# Patient Record
Sex: MALE | Race: WHITE | Employment: OTHER | URBAN - METROPOLITAN AREA
[De-identification: names, ages, dates, MRNs, and addresses within clinical notes are randomized per-mention and may not be internally consistent; named-entity substitution may affect disease eponyms.]

---

## 2022-12-07 ENCOUNTER — HOSPITAL ENCOUNTER (EMERGENCY)
Age: 77
Discharge: HOME | End: 2022-12-07
Attending: PHYSICIAN ASSISTANT
Payer: MEDICARE

## 2022-12-07 ENCOUNTER — APPOINTMENT (OUTPATIENT)
Dept: ULTRASOUND IMAGING | Age: 77
End: 2022-12-07
Attending: EMERGENCY MEDICINE
Payer: MEDICARE

## 2022-12-07 VITALS
RESPIRATION RATE: 18 BRPM | DIASTOLIC BLOOD PRESSURE: 95 MMHG | SYSTOLIC BLOOD PRESSURE: 145 MMHG | HEIGHT: 66 IN | OXYGEN SATURATION: 99 % | HEART RATE: 69 BPM | WEIGHT: 160 LBS | TEMPERATURE: 98.2 F | BODY MASS INDEX: 25.71 KG/M2

## 2022-12-07 DIAGNOSIS — N49.2 SCROTAL WALL ABSCESS: Primary | ICD-10-CM

## 2022-12-07 LAB
APPEARANCE UR: CLEAR
BILIRUB UR QL: NEGATIVE
COLOR UR: YELLOW
GLUCOSE UR STRIP.AUTO-MCNC: NEGATIVE MG/DL
HGB UR QL STRIP: NEGATIVE
KETONES UR QL STRIP.AUTO: NEGATIVE MG/DL
LEUKOCYTE ESTERASE UR QL STRIP.AUTO: NEGATIVE
NITRITE UR QL STRIP.AUTO: NEGATIVE
PH UR STRIP: 6 [PH] (ref 5–8)
PROT UR STRIP-MCNC: 30 MG/DL
RBC #/AREA URNS HPF: NORMAL /HPF (ref 0–5)
SP GR UR REFRACTOMETRY: 1.02 (ref 1–1.03)
UROBILINOGEN UR QL STRIP.AUTO: 1 EU/DL (ref 0.2–1)

## 2022-12-07 PROCEDURE — 81001 URINALYSIS AUTO W/SCOPE: CPT

## 2022-12-07 PROCEDURE — 74011000250 HC RX REV CODE- 250: Performed by: EMERGENCY MEDICINE

## 2022-12-07 PROCEDURE — 75810000289 HC I&D ABSCESS SIMP/COMP/MULT

## 2022-12-07 PROCEDURE — 99284 EMERGENCY DEPT VISIT MOD MDM: CPT

## 2022-12-07 PROCEDURE — 76870 US EXAM SCROTUM: CPT

## 2022-12-07 RX ORDER — METOPROLOL TARTRATE 50 MG/1
TABLET ORAL 2 TIMES DAILY
COMMUNITY

## 2022-12-07 RX ORDER — ALLOPURINOL 100 MG/1
TABLET ORAL DAILY
COMMUNITY

## 2022-12-07 RX ORDER — LIDOCAINE HYDROCHLORIDE AND EPINEPHRINE 10; 10 MG/ML; UG/ML
1.5 INJECTION, SOLUTION INFILTRATION; PERINEURAL ONCE
Status: DISCONTINUED | OUTPATIENT
Start: 2022-12-07 | End: 2022-12-07

## 2022-12-07 RX ORDER — CEFAZOLIN SODIUM 1 G/3ML
1 INJECTION, POWDER, FOR SOLUTION INTRAMUSCULAR; INTRAVENOUS
Status: DISCONTINUED | OUTPATIENT
Start: 2022-12-07 | End: 2022-12-07

## 2022-12-07 RX ORDER — SULFAMETHOXAZOLE AND TRIMETHOPRIM 800; 160 MG/1; MG/1
1 TABLET ORAL 2 TIMES DAILY
Qty: 14 TABLET | Refills: 0 | Status: SHIPPED | OUTPATIENT
Start: 2022-12-07 | End: 2022-12-14

## 2022-12-07 RX ORDER — AMLODIPINE BESYLATE 10 MG/1
TABLET ORAL DAILY
COMMUNITY

## 2022-12-07 RX ADMIN — LIDOCAINE HYDROCHLORIDE 45 MG: 10; .005 INJECTION, SOLUTION EPIDURAL; INFILTRATION; INTRACAUDAL; PERINEURAL at 15:30

## 2022-12-07 NOTE — ED PROVIDER NOTES
EMERGENCY DEPARTMENT HISTORY AND PHYSICAL EXAM      Date: 12/7/2022  Patient Name: Roni Becerra      History of Presenting Illness     Chief Complaint   Patient presents with    Testicle Pain       Location/Duration/Severity/Modifying factors   Chief Complaint   Patient presents with    Testicle Pain       HPI:  Roni Becerra is a 78 y.o. male with history as listed presents with a concern of painful red, swollen mass, draining pus to the left upper scrotum. Started noticing a pimple to the area 1 week ago. Swelling began to grow throughout the week and started to drain 2 days ago. [facility]  No antibiotics were ordered, no imaging studies were ordered. Provider felt this was a scrotal mass internally but could not order urgent imaging. Denies fevers, painful urination, testicular pain or swelling. Associated Symptoms:see ROS      There are no other complaints, changes, or physical findings at this time. PCP: Hayes Rodas (Inactive)    Current Outpatient Medications   Medication Sig Dispense Refill    allopurinoL (ZYLOPRIM) 100 mg tablet Take  by mouth daily. metoprolol tartrate (LOPRESSOR) 50 mg tablet Take  by mouth two (2) times a day. amLODIPine (NORVASC) 10 mg tablet Take  by mouth daily. trimethoprim-sulfamethoxazole (Bactrim DS) 160-800 mg per tablet Take 1 Tablet by mouth two (2) times a day for 7 days. 14 Tablet 0    atorvastatin (LIPITOR) 10 mg tablet Take 10 mg by mouth nightly. Fish,Bora,Flax Oils-OM3,6,9 #1 [phone] mg cap Take  by mouth.      loratadine (CLARITIN) 10 mg tablet Take 10 mg by mouth daily. ECHINACEA 1X PO Take  by mouth.          Past History     Past Medical History:  Past Medical History:   Diagnosis Date    Gout     Hypertension        Past Surgical History:  Past Surgical History:   Procedure Laterality Date    HX CATARACT REMOVAL      HX HEMORRHOIDECTOMY      1990       Family History:  No family history on file.    Social History:  Social History     Tobacco Use    Smoking status: Never    Smokeless tobacco: Never   Substance Use Topics    Alcohol use: Yes     Alcohol/week: 2.0 standard drinks     Types: 2 Shots of liquor per week     Comment: daily    Drug use: Not Currently       Allergies: Allergies   Allergen Reactions    Prednisone Other (comments)         Review of Systems     Review of Systems   Constitutional:  Negative for fever. Respiratory:  Negative for cough and shortness of breath. Gastrointestinal:  Negative for abdominal pain, nausea and vomiting. Genitourinary:  Positive for scrotal swelling. Negative for dysuria, frequency, hematuria, penile pain and testicular pain. Skin:  Positive for rash and wound. Neurological:  Negative for dizziness. Psychiatric/Behavioral:  Negative for confusion. Physical Exam     Physical Exam  Vitals and nursing note reviewed. Constitutional:       General: He is not in acute distress. Appearance: Normal appearance. He is not ill-appearing. HENT:      Head: Normocephalic and atraumatic. Mouth/Throat:      Mouth: Mucous membranes are moist.   Eyes:      Extraocular Movements: Extraocular movements intact. Cardiovascular:      Rate and Rhythm: Normal rate and regular rhythm. Pulses: Normal pulses. Heart sounds: Normal heart sounds. Pulmonary:      Effort: Pulmonary effort is normal.      Breath sounds: Normal breath sounds. Genitourinary:     Penis: Normal.       Testes:         Left: Mass, tenderness and swelling present. Comments: Raised, induration, tender mass with fluctuance, purulent drainage centrally  Skin:     General: Skin is warm and dry. Capillary Refill: Capillary refill takes less than 2 seconds. Neurological:      Mental Status: He is alert and oriented to person, place, and time.    Psychiatric:         Mood and Affect: Mood normal.         Behavior: Behavior normal.       Lab and Diagnostic Study Results     Labs -  Recent Results (from the past 24 hour(s))   URINALYSIS W/ RFLX MICROSCOPIC    Collection Time: 12/07/22 12:30 PM   Result Value Ref Range    Color YELLOW      Appearance CLEAR      Specific gravity 1.021 1.005 - 1.030      pH (UA) 6.0 5.0 - 8.0      Protein 30 (A) NEG mg/dL    Glucose Negative NEG mg/dL    Ketone Negative NEG mg/dL    Bilirubin Negative NEG      Blood Negative NEG      Urobilinogen 1.0 0.2 - 1.0 EU/dL    Nitrites Negative NEG      Leukocyte Esterase Negative NEG     URINE MICROSCOPIC ONLY    Collection Time: 12/07/22 12:30 PM   Result Value Ref Range    RBC 0 to 3 0 - 5 /hpf         Radiologic Studies -   US SCROTUM/TESTICLES W DOPPLER   Final Result      1. Tiny testicular cysts but otherwise unremarkable testicles. 2. Superficial left testicular subcutaneous irregular collection with   surrounding hyperemia, this could represent superficial abscess or hematoma.             Procedures and Critical Care       Performed by: Lindy Noble DO    I&D Abcess Simple    Date/Time: 12/7/2022 9:33 PM  Performed by: Rama Harden DO  Authorized by: Rama Harden DO     Consent:     Consent obtained:  Verbal    Consent given by:  Patient    Risks, benefits, and alternatives were discussed: yes      Risks discussed:  Bleeding, incomplete drainage, pain and infection    Alternatives discussed:  Observation  Universal protocol:     Procedure explained and questions answered to patient or proxy's satisfaction: yes      Relevant documents present and verified: yes      Test results available : yes      Imaging studies available: yes      Site/side marked: yes      Patient identity confirmed:  Verbally with patient  Location:     Type:  Abscess    Size:  ~4 cm    Location:  Anogenital    Anogenital location:  Scrotal wall (Left upper anterior scrotal/medial inguinal region)  Pre-procedure details:     Skin preparation:  Chlorhexidine with alcohol  Sedation:     Sedation type: None  Anesthesia:     Anesthesia method:  Local infiltration    Local anesthetic:  Lidocaine 1% w/o epi  Procedure type:     Complexity:  Simple  Procedure details:     Ultrasound guidance: no      Needle aspiration: no      Incision types:  Cruciate    Incision depth:  Subcutaneous    Wound management:  Probed and deloculated and irrigated with saline    Drainage:  Purulent and bloody    Drainage amount:  Scant    Wound treatment:  Wound left open    Packing materials:  None  Post-procedure details:     Procedure completion:  Tolerated         Jayla Oropeza, DO    Medical Decision Making and ED Course   - I am the first and primary provider for this patient AND AM THE PRIMARY PROVIDER OF RECORD. - I reviewed the vital signs, available nursing notes, past medical history, past surgical history, family history and social history. - Initial assessment performed. The patients presenting problems have been discussed, and the staff are in agreement with the care plan formulated and outlined with them. I have encouraged them to ask questions as they arise throughout their visit. Vital Signs-Reviewed the patient's vital signs. Patient Vitals for the past 12 hrs:   Temp Pulse Resp BP SpO2   12/07/22 1042 98.2 °F (36.8 °C) 69 18 (!) 145/95 99 %         Provider Notes (Medical Decision Making): MDM  Pt presents with anterior left scrotal wall abscess. Performed I&D at bedside with further purulent material drained. No torsion, internal abscess, hydrocele, hernia noted on US. No signs of Delgado's Gangrene. Will cover for MRSA with Bactrim, warm compresses to promote further drainage, PCP follow up for wound check. ------------------------------------------------------------------------------------------------------------        Consultations:       Consultations: - NONE      Disposition         Discharged      Diagnosis     Clinical Impression:   1.  Scrotal wall abscess Attestations:    Arslan Prime, DO

## 2022-12-07 NOTE — ED TRIAGE NOTES
Pt presents with complaints of growing, painful mass to L scrotum that is \"oozing puss and blood\". Pt states the spot was \"pea sized\" one week ago and is now the size of his thumb. [facility] to [facility] who has not been able to see him.

## 2022-12-07 NOTE — ED NOTES
Bedside shift change report given to [facility]  (oncoming nurse) by Silverio Cotto. Report included the following information SBAR, ED Summary and MAR.

## 2022-12-07 NOTE — ED NOTES
Patient resting comfortably on the stretcher with family at the bedside. Patient has no signs or symptoms of acute distress at this time. Patient has no concerns or questions about their treatment plan.

## 2022-12-07 NOTE — DISCHARGE INSTRUCTIONS
Apply warm compress to area daily to promote drainage, take Ibuprofen, Tylenol for pain relief. Complete oral antibiotic course. Leave open to air to allow for drainage and healing of wound from within.